# Patient Record
Sex: MALE | Race: WHITE
[De-identification: names, ages, dates, MRNs, and addresses within clinical notes are randomized per-mention and may not be internally consistent; named-entity substitution may affect disease eponyms.]

---

## 2021-04-16 ENCOUNTER — HOSPITAL ENCOUNTER (EMERGENCY)
Dept: HOSPITAL 41 - JD.ED | Age: 36
Discharge: HOME | End: 2021-04-16
Payer: SELF-PAY

## 2021-04-16 DIAGNOSIS — K04.7: Primary | ICD-10-CM

## 2021-04-16 DIAGNOSIS — Z72.0: ICD-10-CM

## 2021-04-16 PROCEDURE — 96372 THER/PROPH/DIAG INJ SC/IM: CPT

## 2021-04-16 PROCEDURE — 99282 EMERGENCY DEPT VISIT SF MDM: CPT

## 2021-04-16 NOTE — EDM.PDOC
ED HPI GENERAL MEDICAL PROBLEM





- General


Chief Complaint: ENT Problem


Stated Complaint: TOOTH PAIN


Time Seen by Provider: 04/16/21 20:53


Source of Information: Reports: Patient


History Limitations: Reports: No Limitations





- History of Present Illness


INITIAL COMMENTS - FREE TEXT/NARRATIVE: 





Is a 35-year-old male.  He began to have some left upper jaw sensitivity 

yesterday when he got up this morning it was not bothering him too much but 

during the day as he was working he began to get worse and worse he noticed some

swelling of his left cheek.  Due to the pain and swelling in his history of 

having an abscess in his tooth once before he comes to the ER for evaluation.  

He states by the time he got off work of course all the dentist offices were 

closed.  He denies any fever or chills he denies any other acute symptoms.  The 

pain does radiate up into his left ear.


  ** Left Gums


Pain Score (Numeric/FACES): 10





- Related Data


                                    Allergies











Allergy/AdvReac Type Severity Reaction Status Date / Time


 


No Known Allergies Allergy   Verified 04/16/21 21:01











Home Meds: 


                                    Home Meds





. [No Known Home Meds]  04/16/21 [History]











Past Medical History





- Past Surgical History


HEENT Surgical History: Reports: Adenoidectomy


Other HEENT Surgeries/Procedures: tubes in ears as child





Social & Family History





- Tobacco Use


Tobacco Use Status *Q: Current Some Day Tobacco User


Years of Tobacco use: 4


Packs/Tins Daily: 0.1





- Caffeine Use


Caffeine Use: Reports: Coffee





- Recreational Drug Use


Recreational Drug Use: Yes


Drug Use in Last 12 Months: No


Recreational Drug Type: Reports: Marijuana/Hashish





ED ROS ENT





- Review of Systems


Review Of Systems: See Below


Constitutional: Denies: Fever, Chills


HEENT: Reports: Dental Pain, Other (As per HPI)


Respiratory: Reports: No Symptoms


Cardiovascular: Reports: No Symptoms


Endocrine: Reports: No Symptoms


GI/Abdominal: Reports: No Symptoms


: Reports: No Symptoms


Musculoskeletal: Reports: No Symptoms


Skin: Reports: No Symptoms


Neurological: Reports: No Symptoms


Psychiatric: Reports: No Symptoms


Hematologic/Lymphatic: Reports: No Symptoms





ED EXAM, ENT





- Physical Exam


Exam: See Below


Exam Limited By: No Limitations


General Appearance: Alert, WD/WN, No Apparent Distress


Eye Exam: Bilateral Eye: Normal Inspection


Ears: Normal External Exam, Normal Canal, Normal TMs


Nose: Normal Inspection


Mouth/Throat: Normal Inspection, Normal Oropharynx, Other (His upper left wisdom

 tooth is tender when I tap it.  He does have swelling slightly of that left 

cheek but when I run my fingers down it does not appear to have fluctuance or a 

bubble suggesting an abscess is just soft tissue swelling.  No other acute 

findings.)


Head: Normocephalic


Neck: Supple


Respiratory/Chest: No Respiratory Distress


Back: Full Range of Motion


Extremities: Normal Inspection, Normal Range of Motion


Neurological: Alert, Oriented


Psychiatric: Normal Affect, Normal Mood


Skin: Warm, Dry





Course





- Vital Signs


Last Recorded V/S: 





                                Last Vital Signs











Temp  98.4 F   04/16/21 20:56


 


Pulse  83   04/16/21 20:56


 


Resp  12   04/16/21 20:56


 


BP  148/84 H  04/16/21 20:56


 


Pulse Ox  98   04/16/21 20:56














- Orders/Labs/Meds


Orders: 





                               Active Orders 24 hr











 Category Date Time Status


 


 cefTRIAXone [Rocephin] 1 gm Med  04/16/21 21:30 Active





 Lidocaine 1% [Xylocaine 1%] 2.1 ml   





 IM Q24H   








                                Medication Orders





Ceftriaxone Sodium 1 gm/ (Lidocaine HCl 2.1 ml)  0 gm IM Q24H DARIEN








Meds: 





Medications











Generic Name Dose Route Start Last Admin





  Trade Name Ac  PRN Reason Stop Dose Admin


 


Ceftriaxone Sodium 1 gm/  0 gm  04/16/21 21:30 





  Lidocaine HCl 2.1 ml  IM  





  Q24H DARIEN  














Departure





- Departure


Time of Disposition: 21:33


Disposition: Home, Self-Care 01


Condition: Good


Clinical Impression: 


 Dental infection, Pain, dental, Soft tissue swelling








- Discharge Information


*PRESCRIPTION DRUG MONITORING PROGRAM REVIEWED*: Yes


*COPY OF PRESCRIPTION DRUG MONITORING REPORT IN PATIENT DWAIN: No


Instructions:  Dental Abscess, Easy-to-Read


Referrals: 


PCP,None [Primary Care Provider] - 


Additional Instructions: 


When you go out to the [id number] stop at the [employer] machine and get your 

antibiotics and the hydrocodone.  Continue with ice or warm packs for the 

comfort.  You need to follow-up with a dentist on Monday for evaluation and 

treatment.  If the swelling of your cheek worsens markedly or you develop a 

fever return to the ER over the weekend.





Sepsis Event Note (ED)





- Evaluation


Sepsis Screening Result: No Definite Risk





- Focused Exam


Vital Signs: 





                                   Vital Signs











  Temp Pulse Resp BP Pulse Ox


 


 04/16/21 20:56  98.4 F  83  12  148/84 H  98














- My Orders


Last 24 Hours: 





My Active Orders





04/16/21 21:30


cefTRIAXone [Rocephin] 1 gm Lidocaine 1% [Xylocaine 1%] 2.1 ml IM Q24H 














- Assessment/Plan


Last 24 Hours: 





My Active Orders





04/16/21 21:30


cefTRIAXone [Rocephin] 1 gm Lidocaine 1% [Xylocaine 1%] 2.1 ml IM Q24H